# Patient Record
Sex: MALE | Race: WHITE | NOT HISPANIC OR LATINO | Employment: PART TIME | URBAN - METROPOLITAN AREA
[De-identification: names, ages, dates, MRNs, and addresses within clinical notes are randomized per-mention and may not be internally consistent; named-entity substitution may affect disease eponyms.]

---

## 2024-04-02 ENCOUNTER — LAB VISIT (OUTPATIENT)
Dept: LAB | Facility: HOSPITAL | Age: 39
End: 2024-04-02
Attending: UROLOGY
Payer: COMMERCIAL

## 2024-04-02 ENCOUNTER — OFFICE VISIT (OUTPATIENT)
Dept: UROLOGY | Facility: CLINIC | Age: 39
End: 2024-04-02
Payer: COMMERCIAL

## 2024-04-02 VITALS — WEIGHT: 212.5 LBS | HEIGHT: 74 IN | BODY MASS INDEX: 27.27 KG/M2 | RESPIRATION RATE: 18 BRPM

## 2024-04-02 DIAGNOSIS — N52.8 OTHER MALE ERECTILE DYSFUNCTION: Primary | ICD-10-CM

## 2024-04-02 DIAGNOSIS — N46.9 MALE INFERTILITY: ICD-10-CM

## 2024-04-02 DIAGNOSIS — N52.8 OTHER MALE ERECTILE DYSFUNCTION: ICD-10-CM

## 2024-04-02 PROBLEM — G62.9 NEUROPATHY: Status: ACTIVE | Noted: 2023-08-22

## 2024-04-02 PROBLEM — E04.1 NONTOXIC UNINODULAR GOITER: Status: ACTIVE | Noted: 2023-11-28

## 2024-04-02 LAB
BILIRUB UR QL STRIP: NEGATIVE
GLUCOSE UR QL STRIP: NEGATIVE
KETONES UR QL STRIP: NEGATIVE
LEUKOCYTE ESTERASE UR QL STRIP: NEGATIVE
PH, POC UA: 6
POC BLOOD, URINE: NEGATIVE
POC NITRATES, URINE: NEGATIVE
PROT UR QL STRIP: NEGATIVE
SP GR UR STRIP: 1.02 (ref 1–1.03)
UROBILINOGEN UR STRIP-ACNC: 0.2 (ref 0.3–2.2)

## 2024-04-02 PROCEDURE — 99204 OFFICE O/P NEW MOD 45 MIN: CPT | Mod: S$GLB,,, | Performed by: UROLOGY

## 2024-04-02 PROCEDURE — 1159F MED LIST DOCD IN RCRD: CPT | Mod: CPTII,S$GLB,, | Performed by: UROLOGY

## 2024-04-02 PROCEDURE — 36415 COLL VENOUS BLD VENIPUNCTURE: CPT | Mod: PN | Performed by: UROLOGY

## 2024-04-02 PROCEDURE — 1160F RVW MEDS BY RX/DR IN RCRD: CPT | Mod: CPTII,S$GLB,, | Performed by: UROLOGY

## 2024-04-02 PROCEDURE — 99999 PR PBB SHADOW E&M-NEW PATIENT-LVL III: CPT | Mod: PBBFAC,,, | Performed by: UROLOGY

## 2024-04-02 PROCEDURE — 81003 URINALYSIS AUTO W/O SCOPE: CPT | Mod: QW,S$GLB,, | Performed by: UROLOGY

## 2024-04-02 PROCEDURE — 82040 ASSAY OF SERUM ALBUMIN: CPT | Performed by: UROLOGY

## 2024-04-02 PROCEDURE — 3008F BODY MASS INDEX DOCD: CPT | Mod: CPTII,S$GLB,, | Performed by: UROLOGY

## 2024-04-02 RX ORDER — PREGABALIN 75 MG/1
75 CAPSULE ORAL 2 TIMES DAILY
COMMUNITY

## 2024-04-02 RX ORDER — TADALAFIL 5 MG/1
5 TABLET ORAL DAILY
Qty: 30 TABLET | Refills: 11 | Status: SHIPPED | OUTPATIENT
Start: 2024-04-02 | End: 2025-04-02

## 2024-04-02 RX ORDER — TAPENTADOL HYDROCHLORIDE 50 MG/1
50 TABLET, FILM COATED ORAL
COMMUNITY
Start: 2024-02-28

## 2024-04-02 RX ORDER — CELECOXIB 200 MG/1
CAPSULE ORAL 2 TIMES DAILY
COMMUNITY
Start: 2024-02-28

## 2024-04-02 NOTE — PROGRESS NOTES
Chief Complaint   Patient presents with    Initial Visit       History of Present Illness:   Arturo Smith is a 39 y.o. male here for evaluation of Initial Visit  .   4/2/24-40yo male, here for evaluation of ED. He has a fiance', and they want to start a family. Had an accident last year and it resulted in 3 Lumbar spine/ c-spine surgeries. Pt doesn't have any biological children. Libido is low. Has some numbness in his penis. Sometimes he has ED. Usually ejaculates with intercourse.    Fiyeimi' is 35, had miscarriage of twins previously in another relationship. She also has ovarian cysts.   Tried for a couple months a year ago, and it didn't result in any pregnancy.   IPSS 7    Review of Systems   Constitutional:  Negative for chills and fever.   Respiratory:  Negative for shortness of breath.    Cardiovascular:  Negative for chest pain.   Genitourinary:  Negative for frequency and urgency.   Musculoskeletal:  Positive for back pain.   Neurological:  Positive for numbness.   All other systems reviewed and are negative.        No past medical history on file.    Past Surgical History:   Procedure Laterality Date    BIOPSY OF THYROID      CERVICAL SPINE SURGERY      COLONOSCOPY W/ POLYPECTOMY      LUMBAR SPINE SURGERY         Family History   Problem Relation Age of Onset    Thyroid cancer Father        Social History     Tobacco Use    Smoking status: Never    Smokeless tobacco: Never   Substance Use Topics    Alcohol use: Not Currently    Drug use: Never       Current Outpatient Medications   Medication Sig Dispense Refill    celecoxib (CELEBREX) 200 MG capsule Take by mouth 2 (two) times daily.      LYRICA 75 mg capsule Take 75 mg by mouth 2 (two) times daily.      NUCYNTA 50 mg Tab Take 50 mg by mouth.      tadalafiL (CIALIS) 5 MG tablet Take 1 tablet (5 mg total) by mouth Daily. 30 tablet 11     No current facility-administered medications for this visit.       Review of patient's allergies indicates:   Allergen  Reactions    Beef containing products Anaphylaxis and Shortness Of Breath    Honey Anaphylaxis    Codeine     Iodine Other (See Comments)     In shellfish       Physical Exam  Vitals:    04/02/24 1101   Resp: 18       General: Well-developed, well-nourished in no acute distress  HEENT: Normocephalic, atraumatic, Extraocular movements intact  Neck: supple, trachea midline, no cervical or supraclavicular lymphadenopathy  Respirations: even and unlabored  Back: midline spine, no CVA tenderness  Abdomen: soft, Non-tender, non-distended, no organomegaly or palpable masses, no rebound or guarding  : circumcised male phallus without lesions, orthotopic urethral meatus, no inguinal hernia, no inguinal lymphadenopathy, no scrotal lesions, testicles descended bilaterally without mass or tenderness  Extremities: atraumatic, moves all equally, no clubbing, cyanosis or edema  Psych: normal affect  Skin: warm and dry, no lesions  Neuro: Alert and oriented, Cranial nerves II-XII grossly intact    PVR: 25cc    Urinalysis  Negative for blood, LE, nit    Assessment:   1. Other male erectile dysfunction  TESTOSTERONE PANEL    POCT Urinalysis, Dipstick, Automated, W/O Scope      2. Male infertility  Semen Analysis          Plan:  Other male erectile dysfunction  -     TESTOSTERONE PANEL; Future; Expected date: 04/02/2024  -     POCT Urinalysis, Dipstick, Automated, W/O Scope    Male infertility  -     Semen Analysis; Future; Expected date: 04/02/2024    Other orders  -     tadalafiL (CIALIS) 5 MG tablet; Take 1 tablet (5 mg total) by mouth Daily.  Dispense: 30 tablet; Refill: 11        Follow up if symptoms worsen or fail to improve.

## 2024-04-09 ENCOUNTER — TELEPHONE (OUTPATIENT)
Dept: UROLOGY | Facility: CLINIC | Age: 39
End: 2024-04-09
Payer: COMMERCIAL

## 2024-04-09 NOTE — TELEPHONE ENCOUNTER
Spoke with patient, informed him that Semen analysis is to be completed at [facility], patient documented location and verbalized understanding.     ----- Message from Atiya Robertson sent at 4/9/2024  2:52 PM CDT -----  Contact: Arturo  Patient is calling to see where is he suppose to go for his lab. Please callback [phone]

## 2024-04-11 LAB
ALBUMIN SERPL-MCNC: 4.5 G/DL (ref 3.6–5.1)
SHBG SERPL-SCNC: 14 NMOL/L (ref 10–50)
TESTOST FREE SERPL-MCNC: 485.5 PG/ML (ref 46–224)
TESTOST SERPL-MCNC: 1505 NG/DL (ref 250–1100)
TESTOSTERONE.FREE+WB SERPL-MCNC: 998.4 NG/DL

## 2024-04-19 ENCOUNTER — LAB VISIT (OUTPATIENT)
Dept: LAB | Facility: HOSPITAL | Age: 39
End: 2024-04-19
Payer: COMMERCIAL

## 2024-04-19 DIAGNOSIS — N46.9 MALE INFERTILITY: ICD-10-CM

## 2024-04-19 PROCEDURE — 89320 SEMEN ANAL VOL/COUNT/MOT: CPT | Performed by: UROLOGY

## 2024-05-01 ENCOUNTER — OFFICE VISIT (OUTPATIENT)
Dept: UROLOGY | Facility: CLINIC | Age: 39
End: 2024-05-01
Payer: COMMERCIAL

## 2024-05-01 VITALS
RESPIRATION RATE: 18 BRPM | WEIGHT: 209 LBS | BODY MASS INDEX: 26.82 KG/M2 | SYSTOLIC BLOOD PRESSURE: 118 MMHG | HEART RATE: 83 BPM | DIASTOLIC BLOOD PRESSURE: 78 MMHG | HEIGHT: 74 IN

## 2024-05-01 DIAGNOSIS — R68.82 LOW LIBIDO: Primary | ICD-10-CM

## 2024-05-01 DIAGNOSIS — N46.01 AZOOSPERMIA: ICD-10-CM

## 2024-05-01 PROCEDURE — 99213 OFFICE O/P EST LOW 20 MIN: CPT | Mod: S$GLB,,, | Performed by: UROLOGY

## 2024-05-01 PROCEDURE — 1159F MED LIST DOCD IN RCRD: CPT | Mod: CPTII,S$GLB,, | Performed by: UROLOGY

## 2024-05-01 PROCEDURE — 3074F SYST BP LT 130 MM HG: CPT | Mod: CPTII,S$GLB,, | Performed by: UROLOGY

## 2024-05-01 PROCEDURE — 3008F BODY MASS INDEX DOCD: CPT | Mod: CPTII,S$GLB,, | Performed by: UROLOGY

## 2024-05-01 PROCEDURE — 99999 PR PBB SHADOW E&M-EST. PATIENT-LVL III: CPT | Mod: PBBFAC,,, | Performed by: UROLOGY

## 2024-05-01 PROCEDURE — 3078F DIAST BP <80 MM HG: CPT | Mod: CPTII,S$GLB,, | Performed by: UROLOGY

## 2024-05-01 NOTE — PROGRESS NOTES
Chief Complaint   Patient presents with    Follow-up       History of Present Illness:   Arturo Smith is a 39 y.o. male here for evaluation of Follow-up    5/1/24-Testosterone level was high. Preliminary semen analysis showed azospermia but final results are pending. Pt does report h/o a straddle injury about 10 years ago. Also recalls a straddle injury as a child. His libido remains low. His wife accompanies him, who reports that she has undergone numerous procedures for fertility and her issues are considered resolved at this point.    4/2/24-38yo male, here for evaluation of ED. He has a fiance', and they want to start a family. Had an accident last year and it resulted in 3 Lumbar spine/ c-spine surgeries. Pt doesn't have any biological children. Libido is low. Has some numbness in his penis. Sometimes he has ED. Usually ejaculates with intercourse.    Fiance' is 35, had miscarriage of twins previously in another relationship. She also has ovarian cysts.   Tried for a couple months a year ago, and it didn't result in any pregnancy.   IPSS 7    Review of Systems   Constitutional:  Negative for chills and fever.   Respiratory:  Negative for shortness of breath.    Cardiovascular:  Negative for chest pain.   Genitourinary:  Negative for frequency and urgency.   Musculoskeletal:  Positive for back pain.   Neurological:  Positive for numbness.   All other systems reviewed and are negative.        No past medical history on file.    Past Surgical History:   Procedure Laterality Date    BIOPSY OF THYROID      CERVICAL SPINE SURGERY      COLONOSCOPY W/ POLYPECTOMY      LUMBAR SPINE SURGERY         Family History   Problem Relation Name Age of Onset    Thyroid cancer Father         Social History     Tobacco Use    Smoking status: Never    Smokeless tobacco: Never   Substance Use Topics    Alcohol use: Not Currently    Drug use: Never       Current Outpatient Medications   Medication Sig Dispense Refill    celecoxib  (CELEBREX) 200 MG capsule Take by mouth 2 (two) times daily.      LYRICA 75 mg capsule Take 75 mg by mouth 2 (two) times daily.      NUCYNTA 50 mg Tab Take 50 mg by mouth.      tadalafiL (CIALIS) 5 MG tablet Take 1 tablet (5 mg total) by mouth Daily. 30 tablet 11     No current facility-administered medications for this visit.       Review of patient's allergies indicates:   Allergen Reactions    Beef containing products Anaphylaxis and Shortness Of Breath    Honey Anaphylaxis    Codeine     Iodine Other (See Comments)     In shellfish       Physical Exam  Vitals:    05/01/24 1107   BP: 118/78   Pulse: 83   Resp: 18       General: Well-developed, well-nourished in no acute distress  HEENT: Normocephalic, atraumatic, Extraocular movements intact  Neck: supple, trachea midline, no cervical or supraclavicular lymphadenopathy  Respirations: even and unlabored  Back: midline spine, no CVA tenderness  Abdomen: soft, Non-tender, non-distended, no organomegaly or palpable masses, no rebound or guarding  : 4/2/24-circumcised male phallus without lesions, orthotopic urethral meatus, no inguinal hernia, no inguinal lymphadenopathy, no scrotal lesions, testicles descended bilaterally without mass or tenderness  Extremities: atraumatic, moves all equally, no clubbing, cyanosis or edema  Psych: normal affect  Skin: warm and dry, no lesions  Neuro: Alert and oriented, Cranial nerves II-XII grossly intact      Assessment:   1. Low libido  Estradiol      2. Azoospermia  Prolactin    Luteinizing Hormone    FOLLICLE STIMULATING HORMONE    TSH    TESTOSTERONE PANEL    CHROMOSOME ANALYSIS, BLOOD            Plan:  Low libido  -     Estradiol; Future; Expected date: 05/01/2024    Azoospermia  -     Prolactin; Future; Expected date: 05/01/2024  -     Luteinizing Hormone; Future; Expected date: 05/01/2024  -     FOLLICLE STIMULATING HORMONE; Future; Expected date: 05/01/2024  -     TSH; Future; Expected date: 05/01/2024  -      TESTOSTERONE PANEL; Future; Expected date: 05/01/2024  -     CHROMOSOME ANALYSIS, BLOOD; Future; Expected date: 05/01/2024      Will contact pt with results.

## 2024-05-03 LAB
GERM CELLS, SEMEN: ABNORMAL
PH SMN: 8.5 [PH]
PMN'S/100 SPERMATAZOA: 0 %
SPECIMEN VOL SMN: 2 ML
SPERM # SMN: ABNORMAL M
SPERM # SMN: ABNORMAL M/ML
SPERM MOTILE NFR SMN: ABNORMAL %
SPERM NORM MOTILE # SMN: 0 M (ref 50–?)
SPERM NORM NFR SMN: 0 %
SPERM PROG NFR SMN: ABNORMAL %
SPERM SMN: ABNORMAL
VISC SMN QL: ABNORMAL
WBC # SMN: 0 M/ML (ref 0–1)

## 2024-05-07 ENCOUNTER — TELEPHONE (OUTPATIENT)
Dept: UROLOGY | Facility: CLINIC | Age: 39
End: 2024-05-07
Payer: COMMERCIAL

## 2024-05-07 NOTE — TELEPHONE ENCOUNTER
Returned call to pt; stated he wanted to let Dr. Quinonez know that he failed to report a fall that he had in 2021.  Pt states he was on a stairwell that broke when he was in [facility] at [employer] [facility] [employer].  States when he fell, he injured his neck, back and groin area.  Just wanted to make sure MD was informed.

## 2024-08-29 ENCOUNTER — LAB VISIT (OUTPATIENT)
Dept: LAB | Facility: HOSPITAL | Age: 39
End: 2024-08-29
Attending: UROLOGY
Payer: COMMERCIAL

## 2024-08-29 DIAGNOSIS — R68.82 LOW LIBIDO: ICD-10-CM

## 2024-08-29 DIAGNOSIS — N46.01 AZOOSPERMIA: ICD-10-CM

## 2024-08-29 LAB
ESTRADIOL SERPL-MCNC: <10 PG/ML (ref 11–44)
FSH SERPL-ACNC: 1.36 MIU/ML (ref 0.95–11.95)
LH SERPL-ACNC: 0.8 MIU/ML (ref 0.6–12.1)
PROLACTIN SERPL IA-MCNC: 8.2 NG/ML (ref 3.5–19.4)
TSH SERPL DL<=0.005 MIU/L-ACNC: 2.48 UIU/ML (ref 0.4–4)

## 2024-08-29 PROCEDURE — 84146 ASSAY OF PROLACTIN: CPT | Performed by: UROLOGY

## 2024-08-29 PROCEDURE — 84270 ASSAY OF SEX HORMONE GLOBUL: CPT | Performed by: UROLOGY

## 2024-08-29 PROCEDURE — 82670 ASSAY OF TOTAL ESTRADIOL: CPT | Performed by: UROLOGY

## 2024-08-29 PROCEDURE — 83001 ASSAY OF GONADOTROPIN (FSH): CPT | Performed by: UROLOGY

## 2024-08-29 PROCEDURE — 83002 ASSAY OF GONADOTROPIN (LH): CPT | Performed by: UROLOGY

## 2024-08-29 PROCEDURE — 84443 ASSAY THYROID STIM HORMONE: CPT | Performed by: UROLOGY

## 2024-08-29 PROCEDURE — 36415 COLL VENOUS BLD VENIPUNCTURE: CPT | Mod: PN | Performed by: UROLOGY

## 2024-08-30 ENCOUNTER — TELEPHONE (OUTPATIENT)
Dept: UROLOGY | Facility: CLINIC | Age: 39
End: 2024-08-30
Payer: COMMERCIAL

## 2024-08-30 NOTE — TELEPHONE ENCOUNTER
Spoke with patient who was able to provide acceptable patient identifiers prior to start of conversation. Patient will like to know results of recent labs. Patient also states he is interested in an HCG program due to sperm count shown in semen analysis. Would like to know more about program? Will notify Dr. Quinonez reference to patient's concern.

## 2024-08-30 NOTE — TELEPHONE ENCOUNTER
Looks like they didn't draw the chromosome analysis when they did the blood work. We really need that result. Please have him complete that first.

## 2024-09-04 ENCOUNTER — LAB VISIT (OUTPATIENT)
Dept: LAB | Facility: HOSPITAL | Age: 39
End: 2024-09-04
Attending: UROLOGY
Payer: COMMERCIAL

## 2024-09-04 DIAGNOSIS — N46.01 AZOOSPERMIA: ICD-10-CM

## 2024-09-04 PROCEDURE — 88237 TISSUE CULTURE BONE MARROW: CPT | Performed by: UROLOGY

## 2024-09-04 PROCEDURE — 36415 COLL VENOUS BLD VENIPUNCTURE: CPT | Mod: PN | Performed by: UROLOGY

## 2024-09-06 LAB
ALBUMIN SERPL-MCNC: 4.5 G/DL (ref 3.6–5.1)
CHROM BANDING METHOD: NORMAL
CHROMOSOME ANALYSIS BL RELEASED BY: NORMAL
CHROMOSOME ANALYSIS BL RESULT SUMMARY: NORMAL
CLINICAL CYTOGENETICIST REVIEW: NORMAL
KARYOTYP BLD/T: NORMAL
KARYOTYP BLD/T: NORMAL
REASON FOR REFERRAL, CHROMOSOME BLOOD: NORMAL
REF LAB TEST METHOD: NORMAL
SHBG SERPL-SCNC: 11 NMOL/L (ref 10–50)
SPECIMEN SOURCE: NORMAL
SPECIMEN TYPE, CHROMOSOME BLOOD: NORMAL
TESTOST FREE SERPL-MCNC: 18.9 PG/ML (ref 46–224)
TESTOST SERPL-MCNC: 78 NG/DL (ref 250–1100)
TESTOSTERONE.FREE+WB SERPL-MCNC: 38.9 NG/DL

## 2024-09-09 LAB — REASON FOR REFERRAL, CHROMOSOME ANALYSIS CONGENITAL: NORMAL

## 2024-09-10 ENCOUNTER — OFFICE VISIT (OUTPATIENT)
Dept: UROLOGY | Facility: CLINIC | Age: 39
End: 2024-09-10
Payer: COMMERCIAL

## 2024-09-10 VITALS
SYSTOLIC BLOOD PRESSURE: 133 MMHG | RESPIRATION RATE: 17 BRPM | WEIGHT: 208.56 LBS | HEIGHT: 74 IN | DIASTOLIC BLOOD PRESSURE: 90 MMHG | BODY MASS INDEX: 26.77 KG/M2 | HEART RATE: 79 BPM

## 2024-09-10 DIAGNOSIS — E34.9 TESTOSTERONE DEFICIENCY: Primary | ICD-10-CM

## 2024-09-10 DIAGNOSIS — N46.01 AZOOSPERMIA: ICD-10-CM

## 2024-09-10 PROCEDURE — 3008F BODY MASS INDEX DOCD: CPT | Mod: CPTII,S$GLB,, | Performed by: UROLOGY

## 2024-09-10 PROCEDURE — 1159F MED LIST DOCD IN RCRD: CPT | Mod: CPTII,S$GLB,, | Performed by: UROLOGY

## 2024-09-10 PROCEDURE — 99999 PR PBB SHADOW E&M-EST. PATIENT-LVL III: CPT | Mod: PBBFAC,,, | Performed by: UROLOGY

## 2024-09-10 PROCEDURE — 3080F DIAST BP >= 90 MM HG: CPT | Mod: CPTII,S$GLB,, | Performed by: UROLOGY

## 2024-09-10 PROCEDURE — 99214 OFFICE O/P EST MOD 30 MIN: CPT | Mod: S$GLB,,, | Performed by: UROLOGY

## 2024-09-10 PROCEDURE — 1160F RVW MEDS BY RX/DR IN RCRD: CPT | Mod: CPTII,S$GLB,, | Performed by: UROLOGY

## 2024-09-10 PROCEDURE — 3075F SYST BP GE 130 - 139MM HG: CPT | Mod: CPTII,S$GLB,, | Performed by: UROLOGY

## 2024-09-10 RX ORDER — TIZANIDINE 4 MG/1
4 TABLET ORAL NIGHTLY
COMMUNITY
Start: 2024-09-09

## 2024-09-10 NOTE — PROGRESS NOTES
Chief Complaint   Patient presents with    Follow-up       History of Present Illness:   Arturo Smith is a 39 y.o. male here for evaluation of Follow-up    9/10/24-Repeat T level was very low. States that he was on testosterone replacement with an anti-aging clinic, but had been off of it for some time prior to his evaluation with me. He is currently interested in getting on HCG therapy. Chromosomal analysis is not yet back. Having feelings of low T now with low libido. Very interested in fertility.      5/1/24-Testosterone level was high. Preliminary semen analysis showed azospermia but final results are pending. Pt does report h/o a straddle injury about 10 years ago. Also recalls a straddle injury as a child. His libido remains low. His wife accompanies him, who reports that she has undergone numerous procedures for fertility and her issues are considered resolved at this point.    4/2/24-40yo male, here for evaluation of ED. He has a fiance', and they want to start a family. Had an accident last year and it resulted in 3 Lumbar spine/ c-spine surgeries. Pt doesn't have any biological children. Libido is low. Has some numbness in his penis. Sometimes he has ED. Usually ejaculates with intercourse.    Fiance' is 35, had miscarriage of twins previously in another relationship. She also has ovarian cysts.   Tried for a couple months a year ago, and it didn't result in any pregnancy.   IPSS 7    Review of Systems   Constitutional:  Negative for chills and fever.   Respiratory:  Negative for shortness of breath.    Cardiovascular:  Negative for chest pain.   Genitourinary:  Negative for frequency and urgency.   Musculoskeletal:  Positive for back pain.   Neurological:  Positive for numbness.   All other systems reviewed and are negative.        History reviewed. No pertinent past medical history.    Past Surgical History:   Procedure Laterality Date    BIOPSY OF THYROID      CERVICAL SPINE SURGERY      COLONOSCOPY W/  POLYPECTOMY      LUMBAR SPINE SURGERY         Family History   Problem Relation Name Age of Onset    Thyroid cancer Father         Social History     Tobacco Use    Smoking status: Never    Smokeless tobacco: Never   Substance Use Topics    Alcohol use: Not Currently    Drug use: Never       Current Outpatient Medications   Medication Sig Dispense Refill    celecoxib (CELEBREX) 200 MG capsule Take by mouth 2 (two) times daily.      LYRICA 75 mg capsule Take 75 mg by mouth 2 (two) times daily.      NUCYNTA 50 mg Tab Take 50 mg by mouth.      tadalafiL (CIALIS) 5 MG tablet Take 1 tablet (5 mg total) by mouth Daily. 30 tablet 11    tiZANidine (ZANAFLEX) 4 MG tablet Take 4 mg by mouth every evening.      chorionic gonadotropin, human 5,000 unit SolR Inject 1000 IU IM 3 times a week 12 each 6     No current facility-administered medications for this visit.       Review of patient's allergies indicates:   Allergen Reactions    Beef containing products Anaphylaxis and Shortness Of Breath    Honey Anaphylaxis    Codeine     Iodine Other (See Comments)     In shellfish       Physical Exam  Vitals:    09/10/24 0751   BP: (!) 133/90   Pulse: 79   Resp: 17       General: Well-developed, well-nourished in no acute distress  HEENT: Normocephalic, atraumatic, Extraocular movements intact  Neck: supple, trachea midline, no cervical or supraclavicular lymphadenopathy  Respirations: even and unlabored  : 4/2/24-circumcised male phallus without lesions, orthotopic urethral meatus, no inguinal hernia, no inguinal lymphadenopathy, no scrotal lesions, testicles descended bilaterally without mass or tenderness  Extremities: atraumatic, moves all equally, no clubbing, cyanosis or edema  Psych: normal affect  Skin: warm and dry, no lesions  Neuro: Alert and oriented, Cranial nerves II-XII grossly intact    Testosterone:   8/29/24: 78    FSH  8/29/24: 1.36    LH  8/29/24: 0.8      Assessment:   1. Testosterone deficiency  CBC Without  Differential    Comprehensive Metabolic Panel    Prostate Specific Antigen, Diagnostic    Testosterone    Estradiol    chorionic gonadotropin, human 5,000 unit SolR    DISCONTINUED: chorionic gonadotropin, human 5,000 unit SolR      2. Azoospermia                Plan:  Testosterone deficiency  -     CBC Without Differential; Future; Expected date: 09/10/2024  -     Comprehensive Metabolic Panel; Future; Expected date: 09/10/2024  -     Prostate Specific Antigen, Diagnostic; Future; Expected date: 09/10/2024  -     Testosterone; Future; Expected date: 09/10/2024  -     Estradiol; Future; Expected date: 09/10/2024  -     Discontinue: chorionic gonadotropin, human 5,000 unit SolR; Inject 1000 IU IM 3 times a week  Dispense: 12 each; Refill: 6  -     chorionic gonadotropin, human 5,000 unit SolR; Inject 1000 IU IM 3 times a week  Dispense: 12 each; Refill: 6    Azoospermia    Pt most interested in HCG therapy. Will start on 1000 IU HCG IM injections 3 times a week.   Chromosomal analysis still pending  Will need repeat semen analysis in the future. Discussed sperm maturation timeframe.     Follow up in about 3 months (around 12/10/2024) for labs before visit.

## 2024-09-17 ENCOUNTER — TELEPHONE (OUTPATIENT)
Dept: UROLOGY | Facility: CLINIC | Age: 39
End: 2024-09-17
Payer: COMMERCIAL

## 2024-09-17 NOTE — TELEPHONE ENCOUNTER
----- Message from Martina Araujo sent at 9/17/2024 12:24 PM CDT -----  Contact: pt's wife/Elizabeth  Type:  Patient Request a call back (pt has been calling for days and no one returning his calls)    Name of Caller: pt's wife/Elizabeth  Best Call Back Number:  [phone]  Additional Information:  call in rgd to the pt's med, chorionic gonadotropin, human 5,000 unit SolR and need to know if this was sent to [facility] and if so, will the pharm get in touch with them to pay and info about shipping.  Please call patient to advise

## 2024-12-10 ENCOUNTER — LAB VISIT (OUTPATIENT)
Dept: LAB | Facility: HOSPITAL | Age: 39
End: 2024-12-10
Attending: UROLOGY
Payer: COMMERCIAL

## 2024-12-10 DIAGNOSIS — E34.9 TESTOSTERONE DEFICIENCY: ICD-10-CM

## 2024-12-10 LAB
ALBUMIN SERPL BCP-MCNC: 4.3 G/DL (ref 3.5–5.2)
ALP SERPL-CCNC: 74 U/L (ref 40–150)
ALT SERPL W/O P-5'-P-CCNC: 28 U/L (ref 10–44)
ANION GAP SERPL CALC-SCNC: 10 MMOL/L (ref 8–16)
AST SERPL-CCNC: 24 U/L (ref 10–40)
BILIRUB SERPL-MCNC: 0.6 MG/DL (ref 0.1–1)
BUN SERPL-MCNC: 15 MG/DL (ref 6–20)
CALCIUM SERPL-MCNC: 9.3 MG/DL (ref 8.7–10.5)
CHLORIDE SERPL-SCNC: 110 MMOL/L (ref 95–110)
CO2 SERPL-SCNC: 22 MMOL/L (ref 23–29)
COMPLEXED PSA SERPL-MCNC: 0.65 NG/ML (ref 0–4)
CREAT SERPL-MCNC: 1 MG/DL (ref 0.5–1.4)
ERYTHROCYTE [DISTWIDTH] IN BLOOD BY AUTOMATED COUNT: 11.6 % (ref 11.5–14.5)
EST. GFR  (NO RACE VARIABLE): >60 ML/MIN/1.73 M^2
ESTRADIOL SERPL-MCNC: 15 PG/ML (ref 11–44)
GLUCOSE SERPL-MCNC: 104 MG/DL (ref 70–110)
HCT VFR BLD AUTO: 45.4 % (ref 40–54)
HGB BLD-MCNC: 16.3 G/DL (ref 14–18)
MCH RBC QN AUTO: 34 PG (ref 27–31)
MCHC RBC AUTO-ENTMCNC: 35.9 G/DL (ref 32–36)
MCV RBC AUTO: 95 FL (ref 82–98)
PLATELET # BLD AUTO: 163 K/UL (ref 150–450)
PMV BLD AUTO: 12 FL (ref 9.2–12.9)
POTASSIUM SERPL-SCNC: 4.1 MMOL/L (ref 3.5–5.1)
PROT SERPL-MCNC: 7.2 G/DL (ref 6–8.4)
RBC # BLD AUTO: 4.8 M/UL (ref 4.6–6.2)
SODIUM SERPL-SCNC: 142 MMOL/L (ref 136–145)
TESTOST SERPL-MCNC: 309 NG/DL (ref 304–1227)
WBC # BLD AUTO: 7.97 K/UL (ref 3.9–12.7)

## 2024-12-10 PROCEDURE — 36415 COLL VENOUS BLD VENIPUNCTURE: CPT | Mod: PN | Performed by: UROLOGY

## 2024-12-10 PROCEDURE — 80053 COMPREHEN METABOLIC PANEL: CPT | Performed by: UROLOGY

## 2024-12-10 PROCEDURE — 82670 ASSAY OF TOTAL ESTRADIOL: CPT | Performed by: UROLOGY

## 2024-12-10 PROCEDURE — 84403 ASSAY OF TOTAL TESTOSTERONE: CPT | Performed by: UROLOGY

## 2024-12-10 PROCEDURE — 85027 COMPLETE CBC AUTOMATED: CPT | Performed by: UROLOGY

## 2024-12-10 PROCEDURE — 84153 ASSAY OF PSA TOTAL: CPT | Performed by: UROLOGY

## 2024-12-18 ENCOUNTER — OFFICE VISIT (OUTPATIENT)
Dept: UROLOGY | Facility: CLINIC | Age: 39
End: 2024-12-18
Payer: COMMERCIAL

## 2024-12-18 VITALS
WEIGHT: 214.75 LBS | HEIGHT: 74 IN | HEART RATE: 93 BPM | DIASTOLIC BLOOD PRESSURE: 74 MMHG | RESPIRATION RATE: 18 BRPM | BODY MASS INDEX: 27.56 KG/M2 | SYSTOLIC BLOOD PRESSURE: 136 MMHG

## 2024-12-18 DIAGNOSIS — E34.9 TESTOSTERONE DEFICIENCY: Primary | ICD-10-CM

## 2024-12-18 DIAGNOSIS — N46.01 AZOOSPERMIA: ICD-10-CM

## 2024-12-18 PROCEDURE — 1160F RVW MEDS BY RX/DR IN RCRD: CPT | Mod: CPTII,S$GLB,, | Performed by: UROLOGY

## 2024-12-18 PROCEDURE — 1159F MED LIST DOCD IN RCRD: CPT | Mod: CPTII,S$GLB,, | Performed by: UROLOGY

## 2024-12-18 PROCEDURE — 99999 PR PBB SHADOW E&M-EST. PATIENT-LVL III: CPT | Mod: PBBFAC,,, | Performed by: UROLOGY

## 2024-12-18 PROCEDURE — 3008F BODY MASS INDEX DOCD: CPT | Mod: CPTII,S$GLB,, | Performed by: UROLOGY

## 2024-12-18 PROCEDURE — 3075F SYST BP GE 130 - 139MM HG: CPT | Mod: CPTII,S$GLB,, | Performed by: UROLOGY

## 2024-12-18 PROCEDURE — 3078F DIAST BP <80 MM HG: CPT | Mod: CPTII,S$GLB,, | Performed by: UROLOGY

## 2024-12-18 PROCEDURE — 99214 OFFICE O/P EST MOD 30 MIN: CPT | Mod: S$GLB,,, | Performed by: UROLOGY

## 2024-12-18 RX ORDER — CLOMIPHENE CITRATE 50 MG/1
25 TABLET ORAL DAILY
Qty: 15 TABLET | Refills: 3 | Status: SHIPPED | OUTPATIENT
Start: 2024-12-18

## 2024-12-18 NOTE — PROGRESS NOTES
Chief Complaint   Patient presents with    Follow-up     3 month testosterone follow up        History of Present Illness:   Arturo Smith is a 39 y.o. male here for evaluation of Follow-up (3 month testosterone follow up )    12/18/24-Pt has been on HCG 1000IU 3 times a week. Very expensive and difficult to be consistent. Using only about twice a week. Energy is better but not great. Libido is about the same. No ED. No pregnancy yet.     9/10/24-Repeat T level was very low. States that he was on testosterone replacement with an anti-aging clinic, but had been off of it for some time prior to his evaluation with me. He is currently interested in getting on HCG therapy. Chromosomal analysis is not yet back. Having feelings of low T now with low libido. Very interested in fertility.      5/1/24-Testosterone level was high. Preliminary semen analysis showed azospermia but final results are pending. Pt does report h/o a straddle injury about 10 years ago. Also recalls a straddle injury as a child. His libido remains low. His wife accompanies him, who reports that she has undergone numerous procedures for fertility and her issues are considered resolved at this point.    4/2/24-40yo male, here for evaluation of ED. He has a fiance', and they want to start a family. Had an accident last year and it resulted in 3 Lumbar spine/ c-spine surgeries. Pt doesn't have any biological children. Libido is low. Has some numbness in his penis. Sometimes he has ED. Usually ejaculates with intercourse.    Fiance' is 35, had miscarriage of twins previously in another relationship. She also has ovarian cysts.   Tried for a couple months a year ago, and it didn't result in any pregnancy.   IPSS 7    Review of Systems   Constitutional:  Negative for chills and fever.   Respiratory:  Negative for shortness of breath.    Cardiovascular:  Negative for chest pain.   Genitourinary:  Negative for frequency and urgency.   Musculoskeletal:   Positive for back pain.   Neurological:  Positive for numbness.   All other systems reviewed and are negative.        History reviewed. No pertinent past medical history.    Past Surgical History:   Procedure Laterality Date    BIOPSY OF THYROID      CERVICAL SPINE SURGERY      COLONOSCOPY W/ POLYPECTOMY      LUMBAR SPINE SURGERY         Family History   Problem Relation Name Age of Onset    Thyroid cancer Father         Social History     Tobacco Use    Smoking status: Never    Smokeless tobacco: Never   Substance Use Topics    Alcohol use: Not Currently    Drug use: Never       Current Outpatient Medications   Medication Sig Dispense Refill    celecoxib (CELEBREX) 200 MG capsule Take by mouth 2 (two) times daily.      chorionic gonadotropin, human 5,000 unit SolR Inject 1000 IU IM 3 times a week 12 each 6    LYRICA 75 mg capsule Take 75 mg by mouth 2 (two) times daily.      NUCYNTA 50 mg Tab Take 50 mg by mouth.      tadalafiL (CIALIS) 5 MG tablet Take 1 tablet (5 mg total) by mouth Daily. 30 tablet 11    tiZANidine (ZANAFLEX) 4 MG tablet Take 4 mg by mouth every evening.      clomiPHENE (CLOMID) 50 mg tablet Take 0.5 tablets (25 mg total) by mouth once daily. 15 tablet 3     No current facility-administered medications for this visit.       Review of patient's allergies indicates:   Allergen Reactions    Beef containing products Anaphylaxis and Shortness Of Breath    Honey Anaphylaxis    Codeine     Iodine Other (See Comments)     In shellfish       Physical Exam  Vitals:    12/18/24 0841   BP: 136/74   Pulse: 93   Resp: 18       General: Well-developed, well-nourished in no acute distress  HEENT: Normocephalic, atraumatic, Extraocular movements intact  Neck: supple, trachea midline, no cervical or supraclavicular lymphadenopathy  Respirations: even and unlabored  : 4/2/24-circumcised male phallus without lesions, orthotopic urethral meatus, no inguinal hernia, no inguinal lymphadenopathy, no scrotal lesions,  testicles descended bilaterally without mass or tenderness  Extremities: atraumatic, moves all equally, no clubbing, cyanosis or edema  Psych: normal affect  Skin: warm and dry, no lesions  Neuro: Alert and oriented, Cranial nerves II-XII grossly intact    Testosterone:   8/29/24: 78  12/10/24: 309    FSH  8/29/24: 1.36    LH  8/29/24: 0.8    BMP  Lab Results   Component Value Date     12/10/2024    K 4.1 12/10/2024     12/10/2024    CO2 22 (L) 12/10/2024    BUN 15 12/10/2024    CREATININE 1.0 12/10/2024    CALCIUM 9.3 12/10/2024    ANIONGAP 10 12/10/2024    EGFRNORACEVR >60.0 12/10/2024     Lab Results   Component Value Date    WBC 7.97 12/10/2024    HGB 16.3 12/10/2024    HCT 45.4 12/10/2024    MCV 95 12/10/2024     12/10/2024       Lab Results   Component Value Date    ALT 28 12/10/2024    AST 24 12/10/2024    ALKPHOS 74 12/10/2024    BILITOT 0.6 12/10/2024           Assessment:   1. Testosterone deficiency  CBC Without Differential    Comprehensive Metabolic Panel    Prostate Specific Antigen, Diagnostic    Estradiol    Testosterone      2. Azoospermia                  Plan:  Testosterone deficiency  -     CBC Without Differential; Future; Expected date: 03/18/2025  -     Comprehensive Metabolic Panel; Future; Expected date: 03/18/2025  -     Prostate Specific Antigen, Diagnostic; Future; Expected date: 03/18/2025  -     Estradiol; Future; Expected date: 03/18/2025  -     Testosterone; Future; Expected date: 03/18/2025    Azoospermia    Other orders  -     clomiPHENE (CLOMID) 50 mg tablet; Take 0.5 tablets (25 mg total) by mouth once daily.  Dispense: 15 tablet; Refill: 3      Pt having difficulty with consistency and states that treatment is very expensive. Will see if clomid may be a better option for him.   Discussed repeating semen analysis. Pt states that he would like to give it a little more time before repeat testing. Will plan for another 3 months.   Continue cialis 5mg  daily    Follow up in about 3 months (around 3/18/2025) for labs before visit.

## 2025-03-18 ENCOUNTER — LAB VISIT (OUTPATIENT)
Dept: LAB | Facility: HOSPITAL | Age: 40
End: 2025-03-18
Attending: UROLOGY
Payer: COMMERCIAL

## 2025-03-18 ENCOUNTER — OFFICE VISIT (OUTPATIENT)
Dept: UROLOGY | Facility: CLINIC | Age: 40
End: 2025-03-18
Payer: COMMERCIAL

## 2025-03-18 VITALS — WEIGHT: 219.56 LBS | HEIGHT: 74 IN | BODY MASS INDEX: 28.18 KG/M2 | RESPIRATION RATE: 18 BRPM

## 2025-03-18 DIAGNOSIS — E34.9 TESTOSTERONE DEFICIENCY: ICD-10-CM

## 2025-03-18 DIAGNOSIS — N46.01 AZOOSPERMIA: Primary | ICD-10-CM

## 2025-03-18 DIAGNOSIS — N52.8 OTHER MALE ERECTILE DYSFUNCTION: ICD-10-CM

## 2025-03-18 LAB
ALBUMIN SERPL BCP-MCNC: 4 G/DL (ref 3.5–5.2)
ALP SERPL-CCNC: 67 U/L (ref 40–150)
ALT SERPL W/O P-5'-P-CCNC: 27 U/L (ref 10–44)
ANION GAP SERPL CALC-SCNC: 9 MMOL/L (ref 8–16)
AST SERPL-CCNC: 24 U/L (ref 10–40)
BILIRUB SERPL-MCNC: 0.6 MG/DL (ref 0.1–1)
BUN SERPL-MCNC: 11 MG/DL (ref 6–20)
CALCIUM SERPL-MCNC: 8.7 MG/DL (ref 8.7–10.5)
CHLORIDE SERPL-SCNC: 108 MMOL/L (ref 95–110)
CO2 SERPL-SCNC: 25 MMOL/L (ref 23–29)
COMPLEXED PSA SERPL-MCNC: 0.66 NG/ML (ref 0–4)
CREAT SERPL-MCNC: 1 MG/DL (ref 0.5–1.4)
ERYTHROCYTE [DISTWIDTH] IN BLOOD BY AUTOMATED COUNT: 11.8 % (ref 11.5–14.5)
EST. GFR  (NO RACE VARIABLE): >60 ML/MIN/1.73 M^2
ESTRADIOL SERPL-MCNC: 13 PG/ML (ref 11–44)
GLUCOSE SERPL-MCNC: 119 MG/DL (ref 70–110)
HCT VFR BLD AUTO: 45.1 % (ref 40–54)
HGB BLD-MCNC: 15.9 G/DL (ref 14–18)
MCH RBC QN AUTO: 33.5 PG (ref 27–31)
MCHC RBC AUTO-ENTMCNC: 35.3 G/DL (ref 32–36)
MCV RBC AUTO: 95 FL (ref 82–98)
PLATELET # BLD AUTO: 142 K/UL (ref 150–450)
PMV BLD AUTO: 11.7 FL (ref 9.2–12.9)
POTASSIUM SERPL-SCNC: 3.8 MMOL/L (ref 3.5–5.1)
PROT SERPL-MCNC: 6.6 G/DL (ref 6–8.4)
RBC # BLD AUTO: 4.74 M/UL (ref 4.6–6.2)
SODIUM SERPL-SCNC: 142 MMOL/L (ref 136–145)
TESTOST SERPL-MCNC: 504 NG/DL (ref 304–1227)
WBC # BLD AUTO: 4.55 K/UL (ref 3.9–12.7)

## 2025-03-18 PROCEDURE — 3008F BODY MASS INDEX DOCD: CPT | Mod: CPTII,S$GLB,, | Performed by: UROLOGY

## 2025-03-18 PROCEDURE — 85027 COMPLETE CBC AUTOMATED: CPT | Performed by: UROLOGY

## 2025-03-18 PROCEDURE — 99214 OFFICE O/P EST MOD 30 MIN: CPT | Mod: S$GLB,,, | Performed by: UROLOGY

## 2025-03-18 PROCEDURE — 99999 PR PBB SHADOW E&M-EST. PATIENT-LVL III: CPT | Mod: PBBFAC,,, | Performed by: UROLOGY

## 2025-03-18 PROCEDURE — 84403 ASSAY OF TOTAL TESTOSTERONE: CPT | Performed by: UROLOGY

## 2025-03-18 PROCEDURE — 1159F MED LIST DOCD IN RCRD: CPT | Mod: CPTII,S$GLB,, | Performed by: UROLOGY

## 2025-03-18 PROCEDURE — 82670 ASSAY OF TOTAL ESTRADIOL: CPT | Performed by: UROLOGY

## 2025-03-18 PROCEDURE — 80053 COMPREHEN METABOLIC PANEL: CPT | Performed by: UROLOGY

## 2025-03-18 PROCEDURE — 36415 COLL VENOUS BLD VENIPUNCTURE: CPT | Mod: PN | Performed by: UROLOGY

## 2025-03-18 PROCEDURE — 1160F RVW MEDS BY RX/DR IN RCRD: CPT | Mod: CPTII,S$GLB,, | Performed by: UROLOGY

## 2025-03-18 PROCEDURE — 84153 ASSAY OF PSA TOTAL: CPT | Performed by: UROLOGY

## 2025-03-18 RX ORDER — CLOMIPHENE CITRATE 50 MG/1
25 TABLET ORAL DAILY
Qty: 15 TABLET | Refills: 6 | Status: SHIPPED | OUTPATIENT
Start: 2025-03-18

## 2025-03-18 NOTE — PROGRESS NOTES
Chief Complaint   Patient presents with    Follow-up     3 month f/u        History of Present Illness:   Arturo Smith is a 39 y.o. male here for evaluation of Follow-up (3 month f/u )    3/18/25-on clomid. States that energy has improved. Aches/pains have improved. Gets a little more easily aggravated. Would like to repeat the semen analysis. Denies weak stream or stranguria. Cialis gives him a headache and causes nasal congestion, so he is only using it as needed.     12/18/24-Pt has been on HCG 1000IU 3 times a week. Very expensive and difficult to be consistent. Using only about twice a week. Energy is better but not great. Libido is about the same. No ED. No pregnancy yet.     9/10/24-Repeat T level was very low. States that he was on testosterone replacement with an anti-aging clinic, but had been off of it for some time prior to his evaluation with me. He is currently interested in getting on HCG therapy. Chromosomal analysis is not yet back. Having feelings of low T now with low libido. Very interested in fertility.      5/1/24-Testosterone level was high. Preliminary semen analysis showed azospermia but final results are pending. Pt does report h/o a straddle injury about 10 years ago. Also recalls a straddle injury as a child. His libido remains low. His wife accompanies him, who reports that she has undergone numerous procedures for fertility and her issues are considered resolved at this point.    4/2/24-40yo male, here for evaluation of ED. He has a fiance', and they want to start a family. Had an accident last year and it resulted in 3 Lumbar spine/ c-spine surgeries. Pt doesn't have any biological children. Libido is low. Has some numbness in his penis. Sometimes he has ED. Usually ejaculates with intercourse.    Fiance' is 35, had miscarriage of twins previously in another relationship. She also has ovarian cysts.   Tried for a couple months a year ago, and it didn't result in any pregnancy.   IPSS  7    Review of Systems   Constitutional:  Negative for chills and fever.   Respiratory:  Negative for shortness of breath.    Cardiovascular:  Negative for chest pain.   Genitourinary:  Negative for frequency and urgency.   Musculoskeletal:  Positive for back pain.   Neurological:  Positive for numbness.   All other systems reviewed and are negative.        History reviewed. No pertinent past medical history.    Past Surgical History:   Procedure Laterality Date    BIOPSY OF THYROID      CERVICAL SPINE SURGERY      COLONOSCOPY W/ POLYPECTOMY      LUMBAR SPINE SURGERY         Family History   Problem Relation Name Age of Onset    Thyroid cancer Father         Social History     Tobacco Use    Smoking status: Never    Smokeless tobacco: Never   Substance Use Topics    Alcohol use: Not Currently    Drug use: Never       Current Outpatient Medications   Medication Sig Dispense Refill    celecoxib (CELEBREX) 200 MG capsule Take by mouth 2 (two) times daily.      LYRICA 75 mg capsule Take 75 mg by mouth 2 (two) times daily.      NUCYNTA 50 mg Tab Take 50 mg by mouth.      tadalafiL (CIALIS) 5 MG tablet Take 1 tablet (5 mg total) by mouth Daily. 30 tablet 11    tiZANidine (ZANAFLEX) 4 MG tablet Take 4 mg by mouth every evening.      clomiPHENE (CLOMID) 50 mg tablet Take 0.5 tablets (25 mg total) by mouth once daily. 15 tablet 6     No current facility-administered medications for this visit.       Review of patient's allergies indicates:   Allergen Reactions    Beef containing products Anaphylaxis and Shortness Of Breath    Honey Anaphylaxis    Codeine     Iodine Other (See Comments)     In shellfish       Physical Exam  Vitals:    03/18/25 1053   Resp: 18       General: Well-developed, well-nourished in no acute distress  HEENT: Normocephalic, atraumatic, Extraocular movements intact  Neck: supple, trachea midline, no cervical or supraclavicular lymphadenopathy  Respirations: even and unlabored  : 4/2/24-circumcised  male phallus without lesions, orthotopic urethral meatus, no inguinal hernia, no inguinal lymphadenopathy, no scrotal lesions, testicles descended bilaterally without mass or tenderness  Extremities: atraumatic, moves all equally, no clubbing, cyanosis or edema  Psych: normal affect  Skin: warm and dry, no lesions  Neuro: Alert and oriented, Cranial nerves II-XII grossly intact    Testosterone:   8/29/24: 78  12/10/24: 309    FSH  8/29/24: 1.36    LH  8/29/24: 0.8    BMP  Lab Results   Component Value Date     12/10/2024    K 4.1 12/10/2024     12/10/2024    CO2 22 (L) 12/10/2024    BUN 15 12/10/2024    CREATININE 1.0 12/10/2024    CALCIUM 9.3 12/10/2024    ANIONGAP 10 12/10/2024    EGFRNORACEVR >60.0 12/10/2024     Lab Results   Component Value Date    WBC 7.97 12/10/2024    HGB 16.3 12/10/2024    HCT 45.4 12/10/2024    MCV 95 12/10/2024     12/10/2024       Lab Results   Component Value Date    ALT 28 12/10/2024    AST 24 12/10/2024    ALKPHOS 74 12/10/2024    BILITOT 0.6 12/10/2024           Assessment:   1. Azoospermia  Semen Analysis      2. Testosterone deficiency  CBC Without Differential    Comprehensive Metabolic Panel    Prostate Specific Antigen, Diagnostic    Testosterone    Estradiol      3. Other male erectile dysfunction                    Plan:  Azoospermia  -     Semen Analysis; Future; Expected date: 04/18/2025    Testosterone deficiency  -     CBC Without Differential; Future; Expected date: 09/18/2025  -     Comprehensive Metabolic Panel; Future; Expected date: 09/18/2025  -     Prostate Specific Antigen, Diagnostic; Future; Expected date: 09/18/2025  -     Testosterone; Future; Expected date: 09/18/2025  -     Estradiol; Future; Expected date: 09/18/2025    Other male erectile dysfunction    Other orders  -     clomiPHENE (CLOMID) 50 mg tablet; Take 0.5 tablets (25 mg total) by mouth once daily.  Dispense: 15 tablet; Refill: 6      Labs today are pending, will follow  up  Continue Clomid and prn cialis  Semen analysis next month      Follow up in about 6 months (around 9/18/2025) for labs before visit.

## 2025-03-19 ENCOUNTER — RESULTS FOLLOW-UP (OUTPATIENT)
Dept: UROLOGY | Facility: CLINIC | Age: 40
End: 2025-03-19

## 2025-03-19 ENCOUNTER — TELEPHONE (OUTPATIENT)
Dept: UROLOGY | Facility: CLINIC | Age: 40
End: 2025-03-19
Payer: COMMERCIAL

## 2025-03-19 NOTE — TELEPHONE ENCOUNTER
.Outgoing call placed to patient, patient verified name and date of birth,  patient notified of below, he verbalized understanding and no further assistance needed.       ----- Message from Roberta Quinonez MD sent at 3/19/2025  8:59 AM CDT -----  Please let pt know his labs are good. Testosterone level is up to 504.   ----- Message -----  From: Harinder, Oh My [facility]! Lab Interface  Sent: 3/18/2025  10:41 PM CDT  To: Roberta Quinonez MD

## 2025-07-31 ENCOUNTER — LAB VISIT (OUTPATIENT)
Dept: LAB | Facility: HOSPITAL | Age: 40
End: 2025-07-31
Attending: UROLOGY
Payer: COMMERCIAL

## 2025-07-31 DIAGNOSIS — N46.01 AZOOSPERMIA: ICD-10-CM

## 2025-07-31 PROCEDURE — 89320 SEMEN ANAL VOL/COUNT/MOT: CPT

## 2025-08-06 ENCOUNTER — PATIENT MESSAGE (OUTPATIENT)
Dept: UROLOGY | Facility: CLINIC | Age: 40
End: 2025-08-06

## 2025-08-06 ENCOUNTER — TELEPHONE (OUTPATIENT)
Dept: UROLOGY | Facility: CLINIC | Age: 40
End: 2025-08-06
Payer: COMMERCIAL

## 2025-08-06 DIAGNOSIS — N46.9 MALE INFERTILITY: ICD-10-CM

## 2025-08-06 DIAGNOSIS — N46.01 AZOOSPERMIA: ICD-10-CM

## 2025-08-06 DIAGNOSIS — E34.9 TESTOSTERONE DEFICIENCY: ICD-10-CM

## 2025-08-06 RX ORDER — CHORIONIC GONADOTROPIN 10000 UNIT
1000 KIT INTRAMUSCULAR
Qty: 8000 UNITS | Refills: 6 | Status: SHIPPED | OUTPATIENT
Start: 2025-08-07

## 2025-08-06 NOTE — TELEPHONE ENCOUNTER
Ja with the pharmacy stated MD can put in the order for the 10, 000 units as it would be better cost effective for the patient. Message sent to MD.    Copied from CRM #[id number]. Topic: Medications - Pharmacy  >> Aug 6, 2025  2:59 PM Iniki wrote:  .Type:  Pharmacy Calling to Clarify an RX    Name of Caller:Ja  Pharmacy Name:.  [facility] - [place]        Prescription Name:chorionic gonadotropin, human 5,000 unit SolR  What do they need to clarify?:  Best Call Back Number:   Additional Information:

## 2025-08-08 LAB
GERM CELLS (OHS): NORMAL
PH SMN: 8.5 [PH]
PMNS/100 SPERMATOZOA (OHS): 0 %
SPECIMEN VOL SMN: 2.5 ML
SPERM # SMN: 153 M
SPERM # SMN: 61 M/ML
SPERM IMMOTILE NFR SMN: 23 %
SPERM MORPHOLOGY - COMMENT: NORMAL
SPERM MOTILE NFR SMN: 77 %
SPERM NONPROG NFR SMN: 14 %
SPERM NORM MOTILE # SMN: 33 M
SPERM NORM NFR SMN: 28 %
SPERM PROG NFR SMN: 63 %
VISC SMN QL: NORMAL
WBC # SMN: 0 M/ML